# Patient Record
Sex: FEMALE | Race: WHITE | Employment: FULL TIME | URBAN - METROPOLITAN AREA
[De-identification: names, ages, dates, MRNs, and addresses within clinical notes are randomized per-mention and may not be internally consistent; named-entity substitution may affect disease eponyms.]

---

## 2022-11-04 ENCOUNTER — HOSPITAL ENCOUNTER (EMERGENCY)
Age: 22
Discharge: HOME | End: 2022-11-04
Attending: EMERGENCY MEDICINE
Payer: COMMERCIAL

## 2022-11-04 ENCOUNTER — HOSPITAL ENCOUNTER (EMERGENCY)
Dept: GENERAL RADIOLOGY | Age: 22
Discharge: HOME | End: 2022-11-07
Payer: COMMERCIAL

## 2022-11-04 VITALS
OXYGEN SATURATION: 100 % | WEIGHT: 183 LBS | SYSTOLIC BLOOD PRESSURE: 155 MMHG | HEIGHT: 62 IN | BODY MASS INDEX: 33.68 KG/M2 | TEMPERATURE: 98.4 F | DIASTOLIC BLOOD PRESSURE: 111 MMHG | RESPIRATION RATE: 16 BRPM | HEART RATE: 94 BPM

## 2022-11-04 DIAGNOSIS — Z91.81 HISTORY OF FALL: ICD-10-CM

## 2022-11-04 DIAGNOSIS — S89.92XA INJURY OF LEFT KNEE, INITIAL ENCOUNTER: ICD-10-CM

## 2022-11-04 DIAGNOSIS — M25.562 ACUTE PAIN OF LEFT KNEE: Primary | ICD-10-CM

## 2022-11-04 PROCEDURE — 73562 X-RAY EXAM OF KNEE 3: CPT

## 2022-11-04 PROCEDURE — 73502 X-RAY EXAM HIP UNI 2-3 VIEWS: CPT

## 2022-11-04 PROCEDURE — 99283 EMERGENCY DEPT VISIT LOW MDM: CPT

## 2022-11-04 PROCEDURE — 72100 X-RAY EXAM L-S SPINE 2/3 VWS: CPT

## 2022-11-04 RX ORDER — MELOXICAM 15 MG/1
15 TABLET ORAL DAILY
Qty: 14 TABLET | Refills: 1 | Status: SHIPPED | OUTPATIENT
Start: 2022-11-04

## 2022-11-04 ASSESSMENT — PAIN - FUNCTIONAL ASSESSMENT
PAIN_FUNCTIONAL_ASSESSMENT: 0-10
PAIN_FUNCTIONAL_ASSESSMENT: PREVENTS OR INTERFERES SOME ACTIVE ACTIVITIES AND ADLS

## 2022-11-04 ASSESSMENT — ENCOUNTER SYMPTOMS
BACK PAIN: 0
ABDOMINAL PAIN: 0
SHORTNESS OF BREATH: 0
VOMITING: 0
SORE THROAT: 0
NAUSEA: 0
COUGH: 0

## 2022-11-04 ASSESSMENT — PAIN DESCRIPTION - ONSET: ONSET: SUDDEN

## 2022-11-04 ASSESSMENT — PAIN DESCRIPTION - PAIN TYPE: TYPE: ACUTE PAIN

## 2022-11-04 ASSESSMENT — PAIN DESCRIPTION - ORIENTATION: ORIENTATION: LEFT

## 2022-11-04 ASSESSMENT — PAIN SCALES - GENERAL: PAINLEVEL_OUTOF10: 10

## 2022-11-04 ASSESSMENT — PAIN DESCRIPTION - FREQUENCY: FREQUENCY: CONTINUOUS

## 2022-11-04 ASSESSMENT — PAIN DESCRIPTION - LOCATION: LOCATION: KNEE

## 2022-11-04 NOTE — ED TRIAGE NOTES
Pt states she fell at work on Tuesday and injured the left knee. Pt c/o tingling and numbness tonight.

## 2022-11-05 NOTE — DISCHARGE INSTRUCTIONS
Your x-rays were negative. No acute abnormality was appreciated on your back, your hip or your knee. We will treat symptomatically with anti-inflammatory medications. I would like you to follow-up with orthopedics and your primary care physician if your symptoms do not improve.

## 2022-11-05 NOTE — ED PROVIDER NOTES
[facility] Emergency Department Provider Note                   PCP:                Aleksey Dixon MD               Age: 23 y.o. Sex: female       ICD-10-CM    1. Acute pain of left knee  M25.562       2. Injury of left knee, initial encounter  S89. 92XA       3. History of fall  Z91.81           DISPOSITION Decision To Discharge 11/04/2022 08:05:08 PM         Orders Placed This Encounter   Procedures    XR LUMBAR SPINE (2-3 VIEWS)    XR HIP LEFT (2-3 VIEWS)    XR KNEE LEFT (3 VIEWS)        Daivd Klinefelter is a 23 y.o. female who presents to the Emergency Department with chief complaint of    Chief Complaint   Patient presents with    Knee Pain     Left knee pain from prior fall on Tuesday of this week while at work. 78-year-old female presents this department with mother for chief complaint of left knee, left hip and left lower back pain. 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 She has had pain since. And sure if radiates, she describes the pain as a 10. Has not been taking her medications at home. The history is provided by the patient. No  was used. Review of Systems   Constitutional:  Negative for chills and fever. HENT:  Negative for congestion and sore throat. Respiratory:  Negative for cough and shortness of breath. Cardiovascular:  Negative for chest pain and palpitations. Gastrointestinal:  Negative for abdominal pain, nausea and vomiting. Genitourinary:  Negative for dysuria and vaginal discharge. Musculoskeletal:  Positive for arthralgias. Negative for back pain. Skin:  Negative for wound. Psychiatric/Behavioral:  Negative for agitation. All other systems reviewed and are negative. No past medical history on file. No past surgical history on file. No family history on file.      Social History     Socioeconomic History    Marital status: Single Patient has no known allergies. Previous Medications    No medications on file        Vitals signs and nursing note reviewed. Patient Vitals for the past 4 hrs:   Temp Pulse Resp BP SpO2   11/04/22 1921 -- -- -- (!) 155/111 --   11/04/22 1918 98.4 °F (36.9 °C) 94 16 -- 100 %          Physical Exam  Vitals and nursing note reviewed. Constitutional:       General: She is not in acute distress. Appearance: Normal appearance. She is not ill-appearing, toxic-appearing or diaphoretic. HENT:      Head: Normocephalic and atraumatic. Nose: Nose normal.      Mouth/Throat:      Mouth: Mucous membranes are moist.   Eyes:      Pupils: Pupils are equal, round, and reactive to light. Cardiovascular:      Rate and Rhythm: Normal rate. Pulmonary:      Effort: Pulmonary effort is normal. No respiratory distress. Abdominal:      General: Abdomen is flat. Palpations: Abdomen is soft. Tenderness: There is no abdominal tenderness. Musculoskeletal:         General: Normal range of motion. Cervical back: Normal range of motion. No rigidity. Lumbar back: Tenderness present. Left hip: Tenderness present. Left knee: Tenderness present. Skin:     General: Skin is warm. Neurological:      General: No focal deficit present. Mental Status: She is alert. Psychiatric:         Mood and Affect: Mood normal.        MDM  Number of Diagnoses or Management Options  Acute pain of left knee: minor  History of fall: minor  Injury of left knee, initial encounter: minor  Diagnosis management comments: Work-up unremarkable. No acute fractures or other abnormalities appreciated on lumbar spine, XR of left knee, XR of left hip. We will treat her symptomatically with prescription strength anti-inflammatories and she will need to follow-up with the orthopedics as well as primary care physician.   She understands indications for early return, otherwise she will follow-up with the specialist. Amount and/or Complexity of Data Reviewed  Tests in the radiology section of CPT®: ordered and reviewed    Patient Progress  Patient progress: stable      Procedures      Labs Reviewed - No data to display     XR LUMBAR SPINE (2-3 VIEWS)   Final Result   No evidence of fracture or other acute abnormality in the lumbar   spine. XR HIP LEFT (2-3 VIEWS)   Final Result   No evidence of left hip fracture         XR KNEE LEFT (3 VIEWS)   Final Result   Negative left knee                                Voice dictation software was used during the making of this note. This software is not perfect and grammatical and other typographical errors may be present. This note has not been completely proofread for errors.      Chalino Cooper  11/04/22 2027

## 2022-11-07 ENCOUNTER — TELEPHONE (OUTPATIENT)
Dept: ORTHOPEDIC SURGERY | Age: 22
End: 2022-11-07

## 2022-11-07 NOTE — TELEPHONE ENCOUNTER
Called and spoke with patient's mom, went over the work comp process. Will get auth for patient to be seen.

## 2022-11-08 ENCOUNTER — OFFICE VISIT (OUTPATIENT)
Dept: ORTHOPEDIC SURGERY | Age: 22
End: 2022-11-08

## 2022-11-08 DIAGNOSIS — S83.92XA SPRAIN OF LEFT KNEE, UNSPECIFIED LIGAMENT, INITIAL ENCOUNTER: Primary | ICD-10-CM

## 2022-11-08 RX ORDER — DICLOFENAC SODIUM 75 MG/1
75 TABLET, DELAYED RELEASE ORAL 2 TIMES DAILY
Qty: 60 TABLET | Refills: 0 | Status: SHIPPED | OUTPATIENT
Start: 2022-11-08 | End: 2022-12-08

## 2022-11-08 NOTE — LETTER
2022     Name: Alex Garcia  : 2000     Patient is unable to return to work until next appointment. Work Restrictions include:         Duration: 2 weeks      Ubaldo Cruz.  Mohinder Royal MD        Electronically Signed

## 2022-11-08 NOTE — PROGRESS NOTES
Name: Edward Shankar  YOB: 2000  Gender: female  MRN: [id number]      What: Left knee pain  How: On-the-job injury a fall on the bus [address]  When: 11/4/2022    Referring provider: Workmen's Compensation carrier    HPI: Edward Shankar is a 23 y.o. female seen for left knee problems at the request of her Workmen's Compensation carrier. She denies any significant left knee problems. 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 The student use to [employer] to modify her behavior. She dropped her belt under her wheelchair. The patient went to  the bell the  went forward she fell injuring her left knee and her left side including her back her left hip. She complains of tingling down the left lower extremity. She was seen at work well she complains of left-sided pain BMI is over 33. She denies any previous significant left knee, left hip, low back injury      ROS/Meds/PSH/PMH/FH/SH: A ten system review of systems was performed and is negative other than what is in the HPI. Tobacco:  reports that she has never smoked. She has never used smokeless tobacco.  There were no vitals taken for this visit. Physical Examination:  She is awake alert pleasant female ambulating with a slight antalgic gait on the left side    The right knee has a range of motion of 0 to 135 degrees  negative Lachman,  negative anterior drawer,   negative posterior drawer  negative pivot. Good tibial step-off,   No varus or valgus laxity at 0 or 30 degrees. Negative lateral joint line tenderness   negative lateral Collins. Negative medial joint line tenderness  negative medial Collins. No evidence of any posterolateral instability. No patellofemoral pain. Negative compression,   negative apprehension  no effusion. Calves Are non-tender,  neurovascularly patient is intact. Negative Homans. MPFL is non-tender.    Patient Can fully extend the knee. Good quad tone  No erythema. Negative Dial test.  She is neurovascularly intact    The left knee has a range of motion of 0 to 135 degrees  Global left knee pain  negative Lachman,  negative anterior drawer,   negative posterior drawer  negative pivot. Good tibial step-off,   No varus or valgus laxity at 0 or 30 degrees. Negative lateral joint line tenderness   negative lateral Collins. Negative medial joint line tenderness  negative medial Collins. No evidence of any posterolateral instability. No patellofemoral pain. Negative compression,   negative apprehension  no effusion. Calves Are non-tender,  neurovascularly patient is intact. Negative Homans. MPFL is non-tender. Patient Can fully extend the knee. Good quad tone  No erythema. Negative Dial test.  She is neurovascularly intact    She has a full range of motion in both hips. She complains of left-sided buttock pain, hip pain, low back pain            Data Reviewed:    X-rays of the left knee, her hips and low back demonstrate no fracture. No abnormality. Impression:   1. Sprain of left knee, unspecified ligament, initial encounter       Rule out internal derangement left knee  Left hip sprain  Low back sprain  Overweight with a BMI of 33    Plan:   I discussed the problem with the patient. I discussed nonoperative versus operative intervention including injections. We will defer any injections for now. I do not believe she will require surgery. I gave her prescriptions for gabapentin, Voltaren and Voltaren gel. We will get her started in supervised physical therapy working on core strengthening full motion and full strength left knee. I provided with an out of work note for 2 weeks. I will recheck her back in 2 weeks. 4 This is an acute complicated injury    Follow up: Return in about 2 weeks (around 11/22/2022).      Copy that note to her Workmen's Compensation         Quoc Hardy MD

## 2022-11-21 ENCOUNTER — OFFICE VISIT (OUTPATIENT)
Dept: ORTHOPEDIC SURGERY | Age: 22
End: 2022-11-21

## 2022-11-21 DIAGNOSIS — S83.92XA SPRAIN OF LEFT KNEE, UNSPECIFIED LIGAMENT, INITIAL ENCOUNTER: Primary | ICD-10-CM

## 2022-11-21 NOTE — PROGRESS NOTES
Name: Bryan Basurto  YOB: 2000  Gender: female  MRN: [id number]          HPI: Bryan Basurto is a 23 y.o. female seen for left knee problems she has not started physical therapy yet. She is doing little bit better. ROS/Meds/PSH/PMH/FH/SH: A ten system review of systems was performed and is negative other than what is in the HPI. Tobacco:  reports that she has never smoked. She has never used smokeless tobacco.  There were no vitals taken for this visit. Physical Examination:  She is awake alert pleasant female ambulating with a slight antalgic gait on the left side    The right knee has a range of motion of 0 to 135 degrees  negative Lachman,  negative anterior drawer,   negative posterior drawer  negative pivot. Good tibial step-off,   No varus or valgus laxity at 0 or 30 degrees. Negative lateral joint line tenderness   negative lateral Collins. Negative medial joint line tenderness  negative medial Collins. No evidence of any posterolateral instability. No patellofemoral pain. Negative compression,   negative apprehension  no effusion. Calves Are non-tender,  neurovascularly patient is intact. Negative Homans. MPFL is non-tender. Patient Can fully extend the knee. Good quad tone  No erythema. Negative Dial test.  She is neurovascularly intact    The left knee has a range of motion of 0 to 135 degrees  Global left knee pain  negative Lachman,  negative anterior drawer,   negative posterior drawer  negative pivot. Good tibial step-off,   No varus or valgus laxity at 0 or 30 degrees. Negative lateral joint line tenderness   negative lateral Collins. Negative medial joint line tenderness  negative medial Collins. No evidence of any posterolateral instability. No patellofemoral pain. Negative compression,   negative apprehension  no effusion. Calves Are non-tender,  neurovascularly patient is intact. Negative Homans. MPFL is non-tender. Patient Can fully extend the knee. Good quad tone  No erythema. Negative Dial test.  She is neurovascularly intact    She has a full range of motion in both hips. She complains of left-sided buttock pain, hip pain, low back pain            Data Reviewed:        Impression:   1. Sprain of left knee, unspecified ligament, initial encounter         Rule out internal derangement left knee  Left hip sprain  Low back sprain  Overweight with a BMI of 33    Plan:   I discussed the problem with the patient. I discussed nonoperative versus operative intervention including injections. We will defer any injections for now. I do not believe she will require surgery. We will start in supervised physical therapy working on core strengthening full motion and full strength left knee. I provided with an out of work note for 3 weeks. I will recheck her back in 3 weeks. 3.  Stable chronic illness  Follow up: Return in about 3 weeks (around 12/12/2022).      Copy this note to PHYSICIAN'S [facility] Workmen's Compensation  and please make a note with nurse  with her        Troy Topete MD

## 2022-11-21 NOTE — LETTER
2022     Name: Opal Gonzalez  : 2000     Patient is unable to return to work until next appointment. Work Restrictions include:         Duration: 3 weeks      Forrest Evans.  Char Hernandez MD        Electronically Signed

## 2022-12-13 ENCOUNTER — OFFICE VISIT (OUTPATIENT)
Dept: ORTHOPEDIC SURGERY | Age: 22
End: 2022-12-13

## 2022-12-13 DIAGNOSIS — S83.92XA SPRAIN OF LEFT KNEE, UNSPECIFIED LIGAMENT, INITIAL ENCOUNTER: Primary | ICD-10-CM

## 2022-12-13 NOTE — PROGRESS NOTES
12/13/2022      Leda Collier  [id number]  2000      DISABILITY RATING    Ms. Mendel Haus has reached maximum medical improvement. The permanent partial impairment of her left knee related to her workplace injury is a 0% permanent partial impairment according to the [facility] Guides to the Evaluation of Permanent Impairment, Sixth Edition. This corresponds to a 0% whole body impairment. She can return to work regular duty. Please feel free to contact my office for any further questions. Binta Priest MD

## 2022-12-13 NOTE — LETTER
2022     Name: Chelsey Bazan  : 2000     Patient may return to work on regular duty on: 2022    Work Restrictions include:         Duration: permanent      Glenrockchloe Rowland.  Julia Rivera MD        Electronically Signed

## 2022-12-13 NOTE — PROGRESS NOTES
Name: Daivd Klinefelter  YOB: 2000  Gender: female  MRN: [id number]          HPI: Daivd Klinefelter is a 23 y.o. female seen for left knee problems. She returns and notes that her left knee is better. ROS/Meds/PSH/PMH/FH/SH: A ten system review of systems was performed and is negative other than what is in the HPI. Tobacco:  reports that she has never smoked. She has never used smokeless tobacco.  There were no vitals taken for this visit. Physical Examination:  She is awake alert pleasant female ambulating with a slight antalgic gait on the left side    The right knee has a range of motion of 0 to 135 degrees  negative Lachman,  negative anterior drawer,   negative posterior drawer  negative pivot. Good tibial step-off,   No varus or valgus laxity at 0 or 30 degrees. Negative lateral joint line tenderness   negative lateral Collins. Negative medial joint line tenderness  negative medial Collins. No evidence of any posterolateral instability. No patellofemoral pain. Negative compression,   negative apprehension  no effusion. Calves Are non-tender,  neurovascularly patient is intact. Negative Homans. MPFL is non-tender. Patient Can fully extend the knee. Good quad tone  No erythema. Negative Dial test.  She is neurovascularly intact    The left knee has a range of motion of 0 to 135 degrees  negative Lachman,  negative anterior drawer,   negative posterior drawer  negative pivot. Good tibial step-off,   No varus or valgus laxity at 0 or 30 degrees. Negative lateral joint line tenderness   negative lateral Collins. Negative medial joint line tenderness  negative medial Collins. No evidence of any posterolateral instability. No patellofemoral pain. Negative compression,   negative apprehension  no effusion. Calves Are non-tender,  neurovascularly patient is intact. Negative Homans. MPFL is non-tender. Patient Can fully extend the knee.    Good quad tone  No erythema. Negative Dial test.        She has a full range of motion in both hips. She complains of left-sided buttock pain, hip pain, low back pain            Data Reviewed:        Impression:   1. Sprain of left knee, unspecified ligament, initial encounter         Rule out internal derangement left knee  Left hip sprain  Low back sprain  Overweight with a BMI of 33    Plan:   I discussed the problem with the patient. She is better. From my standpoint she has reached maximum medical improvement please see the associated disability rating. She can return to work regular duty. I will recheck her back on an as-needed basis    2. Self-limited problem      Follow up: No follow-ups on file.      Copy this note to PHYSICIAN'S [facility] Workmen's Compensation  and please make a note with nurse  with her        Troy Topete MD